# Patient Record
(demographics unavailable — no encounter records)

---

## 2025-04-21 NOTE — HISTORY OF PRESENT ILLNESS
[FreeTextEntry1] : Transitioning from pediatrician. New pt visit. [de-identified] : 1. ADHD sees psychiatrist who prescribes concerta which helps.  2. Obesity lost wt through exercise and working on better eating w meal prep. finds adhd meds help appetite.  3. hx anxiety/depression took fluoxetine in the p 4. takes zyrtec for allergies 5. hm does jujitsu. feels fine during. lives w family. 2 sexual partners this year (men/women) uses condoms. fhx diabetes in family. utd vaccines. tdap today. due for dental/ophtho. skin ok.

## 2025-04-21 NOTE — ASSESSMENT
[FreeTextEntry1] : 1. ADHD sees psychiatrist who prescribes concerta which helps. 18mg daily.  2. Obesity lost wt through exercise and working on better eating w meal prep.  3. hx anxiety/depression discussed sx to reach out to restart meds. cont beh techinques which help him 4. cont zyrtec for allergies 5. hm discussed safe sex. sti panel. labs given diabetes in family. utd vaccines. tdap today. due for dental/ophtho he will schedule. disucssed injury prevention w sofiya. skin ok.